# Patient Record
Sex: MALE | Race: WHITE | Employment: STUDENT | URBAN - METROPOLITAN AREA
[De-identification: names, ages, dates, MRNs, and addresses within clinical notes are randomized per-mention and may not be internally consistent; named-entity substitution may affect disease eponyms.]

---

## 2018-10-25 ENCOUNTER — HOSPITAL ENCOUNTER (EMERGENCY)
Age: 23
Discharge: HOME | End: 2018-10-25
Attending: EMERGENCY MEDICINE
Payer: OTHER GOVERNMENT

## 2018-10-25 VITALS
HEART RATE: 82 BPM | BODY MASS INDEX: 26.73 KG/M2 | TEMPERATURE: 97.6 F | OXYGEN SATURATION: 99 % | RESPIRATION RATE: 21 BRPM | SYSTOLIC BLOOD PRESSURE: 113 MMHG | WEIGHT: 215 LBS | DIASTOLIC BLOOD PRESSURE: 55 MMHG | HEIGHT: 75 IN

## 2018-10-25 DIAGNOSIS — R11.2 NAUSEA AND VOMITING, INTRACTABILITY OF VOMITING NOT SPECIFIED, UNSPECIFIED VOMITING TYPE: ICD-10-CM

## 2018-10-25 DIAGNOSIS — F10.920 ALCOHOLIC INTOXICATION WITHOUT COMPLICATION (HCC): Primary | ICD-10-CM

## 2018-10-25 PROCEDURE — 99284 EMERGENCY DEPT VISIT MOD MDM: CPT

## 2018-10-25 PROCEDURE — 96374 THER/PROPH/DIAG INJ IV PUSH: CPT

## 2018-10-25 PROCEDURE — 74011250636 HC RX REV CODE- 250/636: Performed by: PHYSICIAN ASSISTANT

## 2018-10-25 RX ORDER — ONDANSETRON 4 MG/1
TABLET, ORALLY DISINTEGRATING ORAL
Qty: 10 TAB | Refills: 0 | Status: SHIPPED | OUTPATIENT
Start: 2018-10-25

## 2018-10-25 RX ORDER — ONDANSETRON 2 MG/ML
4 INJECTION INTRAMUSCULAR; INTRAVENOUS
Status: COMPLETED | OUTPATIENT
Start: 2018-10-25 | End: 2018-10-25

## 2018-10-25 RX ADMIN — ONDANSETRON 4 MG: 2 INJECTION INTRAMUSCULAR; INTRAVENOUS at 22:37

## 2018-10-25 RX ADMIN — SODIUM CHLORIDE 1000 ML: 900 INJECTION, SOLUTION INTRAVENOUS at 22:35

## 2018-10-26 NOTE — ED PROVIDER NOTES
EMERGENCY DEPARTMENT HISTORY AND PHYSICAL EXAM 
 
Date: 10/25/2018 Patient Name: [facility] History of Presenting Illness Chief Complaint Patient presents with  Vomiting  Nausea History Provided By: Patient Chief Complaint: Drank too much alcohol Duration: 5 Hours Timing:  Acute Location: GI 
Quality: denies pain Severity: Mild Modifying Factors: N/A Associated Symptoms: nausea and vomiting Additional History (Context): [facility] is a 21 y.o. transgender male with diabetes who presents with nausea and vomiting after drinking two glasses of whiskey (approximately 6-8 oz). Patient reports she lost track of time and did not feel good. Pt is a diabetic and wanted to make sure she was fine. Pt stated she was drinking by herself since her  is deployed. She denies S/I, H/I ideation, or depression. Pt takes metformin and hormones. Pt stated she will take an uber back home. Pt is alert and oriented at this time and able to answer all questions. PCP: [facility], Not On File Current Facility-Administered Medications Medication Dose Route Frequency Provider Last Rate Last Dose  sodium chloride 0.9 % bolus infusion 1,000 mL  1,000 mL IntraVENous ONCE Cesar Lorenzo PA 1,000 mL/hr at 10/25/18 2235 1,000 mL at 10/25/18 2235 Current Outpatient Medications Medication Sig Dispense Refill  ondansetron (ZOFRAN ODT) 4 mg disintegrating tablet Take 1-2 tablets every 6-8 hours as needed for nausea and vomiting. 10 Tab 0  
 insulin detemir (LEVEMIR) 100 unit/mL injection 0.12 mL by SubCUTAneous route daily. 1 Vial 0  
 insulin lispro (HUMALOG) 100 unit/mL injection As per sliding scale as above 1 Vial 0  Blood-Glucose Meter monitoring kit As directed 1 Kit 0  
 glucose blood VI test strips (BLOOD GLUCOSE TEST) strip As directed 100 Strip 0  
 spironolactone (ALDACTONE) 50 mg tablet Take 50 mg by mouth daily.  estradiol (ESTRACE) 0.5 mg tablet Take 0.5 mg by mouth two (2) times a day. Past History Past Medical History: 
Past Medical History:  
Diagnosis Date  Diabetes ([address] Past Surgical History: 
History reviewed. No pertinent surgical history. Family History: 
History reviewed. No pertinent family history. Social History: 
Social History Tobacco Use  Smoking status: Never Smoker  Smokeless tobacco: Never Used Substance Use Topics  Alcohol use: Yes Comment: occasionally  Drug use: No  
 
 
Allergies: Allergies Allergen Reactions  Shellfish Derived Swelling Review of Systems Review of Systems Constitutional: Negative for activity change, appetite change, chills, diaphoresis, fatigue, fever and unexpected weight change. Cardiovascular: Negative for chest pain. Gastrointestinal: Positive for nausea and vomiting. Negative for abdominal distention, abdominal pain, anal bleeding, blood in stool, constipation, diarrhea and rectal pain. Musculoskeletal: Negative. Skin: Negative for color change, pallor, rash and wound. Neurological: Negative for dizziness, tremors, seizures, syncope, facial asymmetry, speech difficulty, weakness, light-headedness, numbness and headaches. Psychiatric/Behavioral: Negative for agitation, behavioral problems, confusion, decreased concentration, dysphoric mood, hallucinations, self-injury, sleep disturbance and suicidal ideas. The patient is not nervous/anxious and is not hyperactive. All other systems reviewed and are negative. All Other Systems Negative Physical Exam  
 
Vitals:  
 10/25/18 2144 10/25/18 2148 10/25/18 2208 BP: 113/55 Pulse: 82 82 Resp: 20 21 Temp: 97.6 °F (36.4 °C) SpO2:  99% Weight:   97.5 kg (215 lb) Height:   6' 3\" (1.905 m) Physical Exam  
Constitutional: He is oriented to person, place, and time. He appears well-developed and well-nourished. Patient identifies self as female. HENT:  
Head: Normocephalic. Eyes: Conjunctivae are normal. Pupils are equal, round, and reactive to light. Neck: Normal range of motion. Cardiovascular: Normal rate. Pulmonary/Chest: Effort normal and breath sounds normal.  
Abdominal: Soft. Bowel sounds are normal.  
Musculoskeletal: Normal range of motion. Neurological: He is alert and oriented to person, place, and time. Psychiatric: He has a normal mood and affect. His speech is normal and behavior is normal. Judgment and thought content normal. Cognition and memory are normal.  
Vitals reviewed. Diagnostic Study Results Labs - No results found for this or any previous visit (from the past 12 hour(s)). Radiologic Studies - No orders to display CT Results  (Last 48 hours) None CXR Results  (Last 48 hours) None Medical Decision Making I am the first provider for this patient. I reviewed the vital signs, available nursing notes, past medical history, past surgical history, family history and social history. Vital Signs-Reviewed the patient's vital signs. Pulse Oximetry Analysis - 99% RA Records Reviewed: Old Medical Records Procedures: 
Procedures Provider Notes (Medical Decision Making):  
Pt states she is feeling much better with fluids and Zofran. Will discharge home a this time. DIscussed alcohol use and sources for assistance if needed. MED RECONCILIATION: 
Current Facility-Administered Medications Medication Dose Route Frequency  sodium chloride 0.9 % bolus infusion 1,000 mL  1,000 mL IntraVENous ONCE Current Outpatient Medications Medication Sig  
 ondansetron (ZOFRAN ODT) 4 mg disintegrating tablet Take 1-2 tablets every 6-8 hours as needed for nausea and vomiting.  insulin detemir (LEVEMIR) 100 unit/mL injection 0.12 mL by SubCUTAneous route daily.  insulin lispro (HUMALOG) 100 unit/mL injection As per sliding scale as above  Blood-Glucose Meter monitoring kit As directed  glucose blood VI test strips (BLOOD GLUCOSE TEST) strip As directed  spironolactone (ALDACTONE) 50 mg tablet Take 50 mg by mouth daily.  estradiol (ESTRACE) 0.5 mg tablet Take 0.5 mg by mouth two (2) times a day. Disposition: 
Discharge DISCHARGE NOTE:  
Pt has been reexamined. Patient has no new complaints, changes, or physical findings. Care plan outlined and precautions discussed. Results of visitwere reviewed with the patient. All medications were reviewed with the patient; will d/c home with zofran. All of pt's questions and concerns were addressed. Patient was instructed and agrees to follow up with PCP, as well as to return to the ED upon further deterioration. Patient is ready to go home. Follow-up Information Follow up With Specialties Details Why Contact Info [facility], Not On File  Call As needed, follow up Not On File (58) Patient has a PCP but [facility]  If symptoms worsen [address] 
[phone] Diagnosis Clinical Impression: 1. Alcoholic intoxication without complication ([address] 2. Nausea and vomiting, intractability of vomiting not specified, unspecified vomiting type

## 2018-10-26 NOTE — ED TRIAGE NOTES
Pt arrived via EMS with c/o nausea and vomiting after drinking [place] this evening, friends called ems. Pt tearful at present. Denies any other issues at present.

## 2018-10-26 NOTE — ED NOTES
I have reviewed discharge instruction and prescriptions with patient. Patient verbalized understanding and has no further questions at this time. Education taught and patient verbalized understanding of education. Teach back method used. Right ac IV removed, catheter tip intact on removal.  Armband removed and shredded per patients request.   
Patients pain 0/10. Belongings given to patient. Patient discharged with self to call taxi.